# Patient Record
Sex: FEMALE | Race: WHITE | URBAN - METROPOLITAN AREA
[De-identification: names, ages, dates, MRNs, and addresses within clinical notes are randomized per-mention and may not be internally consistent; named-entity substitution may affect disease eponyms.]

---

## 2017-01-31 NOTE — PATIENT INSTRUCTIONS
Continue medications as directed    If unable to get ativan from your previous doctor, take 1/2 pill daily for 6 days, then 1/4 pill after that    Follow up for physical  Return to clinic for any new or worsening symptoms or go to ER Urgent care in off hours

## 2017-02-02 ENCOUNTER — TELEPHONE (OUTPATIENT)
Dept: PALLIATIVE MEDICINE | Facility: CLINIC | Age: 52
End: 2017-02-02

## 2017-02-02 ENCOUNTER — OFFICE VISIT (OUTPATIENT)
Dept: FAMILY MEDICINE | Facility: CLINIC | Age: 52
End: 2017-02-02
Payer: COMMERCIAL

## 2017-02-02 ENCOUNTER — OFFICE VISIT (OUTPATIENT)
Dept: PALLIATIVE MEDICINE | Facility: CLINIC | Age: 52
End: 2017-02-02
Payer: COMMERCIAL

## 2017-02-02 VITALS — DIASTOLIC BLOOD PRESSURE: 74 MMHG | OXYGEN SATURATION: 98 % | SYSTOLIC BLOOD PRESSURE: 136 MMHG | HEART RATE: 95 BPM

## 2017-02-02 VITALS
BODY MASS INDEX: 20.11 KG/M2 | DIASTOLIC BLOOD PRESSURE: 78 MMHG | OXYGEN SATURATION: 97 % | TEMPERATURE: 98.2 F | HEART RATE: 94 BPM | HEIGHT: 67 IN | SYSTOLIC BLOOD PRESSURE: 126 MMHG | WEIGHT: 128.1 LBS

## 2017-02-02 DIAGNOSIS — M79.7 FIBROMYALGIA: ICD-10-CM

## 2017-02-02 DIAGNOSIS — F41.1 GAD (GENERALIZED ANXIETY DISORDER): ICD-10-CM

## 2017-02-02 DIAGNOSIS — G43.719 INTRACTABLE CHRONIC MIGRAINE WITHOUT AURA AND WITHOUT STATUS MIGRAINOSUS: Primary | ICD-10-CM

## 2017-02-02 PROCEDURE — 64615 CHEMODENERV MUSC MIGRAINE: CPT | Performed by: PAIN MEDICINE

## 2017-02-02 PROCEDURE — 99214 OFFICE O/P EST MOD 30 MIN: CPT | Performed by: PHYSICIAN ASSISTANT

## 2017-02-02 RX ORDER — DULOXETIN HYDROCHLORIDE 20 MG/1
20 CAPSULE, DELAYED RELEASE ORAL 2 TIMES DAILY
Qty: 60 CAPSULE | Refills: 3 | Status: SHIPPED
Start: 2017-02-02 | End: 2017-02-02

## 2017-02-02 RX ORDER — DULOXETIN HYDROCHLORIDE 20 MG/1
20 CAPSULE, DELAYED RELEASE ORAL 2 TIMES DAILY
Qty: 60 CAPSULE | Refills: 3 | Status: SHIPPED
Start: 2017-02-02

## 2017-02-02 RX ORDER — CYCLOBENZAPRINE HCL 10 MG
10 TABLET ORAL 2 TIMES DAILY PRN
Qty: 60 TABLET | Refills: 3 | Status: SHIPPED | OUTPATIENT
Start: 2017-02-02

## 2017-02-02 RX ORDER — LORAZEPAM 1 MG/1
TABLET ORAL
Qty: 4 TABLET | Refills: 0 | Status: SHIPPED | OUTPATIENT
Start: 2017-02-02

## 2017-02-02 RX ORDER — LORAZEPAM 1 MG/1
TABLET ORAL
Qty: 4 TABLET | Refills: 0 | Status: SHIPPED | OUTPATIENT
Start: 2017-02-02 | End: 2017-02-02

## 2017-02-02 RX ORDER — GABAPENTIN 600 MG/1
600 TABLET ORAL 3 TIMES DAILY
Qty: 90 TABLET | Refills: 3 | Status: SHIPPED | OUTPATIENT
Start: 2017-02-02

## 2017-02-02 ASSESSMENT — PAIN SCALES - GENERAL: PAINLEVEL: MODERATE PAIN (5)

## 2017-02-02 NOTE — NURSING NOTE
"Chief Complaint   Patient presents with     Pain       Initial /74 mmHg  Pulse 95  SpO2 98% Estimated body mass index is 19.89 kg/(m^2) as calculated from the following:    Height as of 8/10/16: 1.702 m (5' 7\").    Weight as of 8/10/16: 57.607 kg (127 lb).  BP completed using cuff size: regular    Camilo Yee MA  [facility]      "

## 2017-02-02 NOTE — NURSING NOTE
"Chief Complaint   Patient presents with     Recheck Medication       Initial /78 mmHg  Pulse 94  Temp(Src) 98.2  F (36.8  C) (Oral)  Ht 5' 7\" (1.702 m)  Wt 128 lb 1.6 oz (58.106 kg)  BMI 20.06 kg/m2  SpO2 97% Estimated body mass index is 20.06 kg/(m^2) as calculated from the following:    Height as of this encounter: 5' 7\" (1.702 m).    Weight as of this encounter: 128 lb 1.6 oz (58.106 kg).  BP completed using cuff size: regular    "

## 2017-02-02 NOTE — PATIENT INSTRUCTIONS
[facility]  1. Will start Cymbalta 20 mg in the morning. If well tolerated, increase after > 7 days to 20 mg morning and 20 mg bedtime.  Monitor mood. We talked about the fact that this medication affects Serotonin; please notify your doctors if they prescribe new medications for you in the future. Other side effects may include weight gain, withdrawal syndrome.   2. Continue Gabapentin.     3. You underwent repeat Botox injection today. See post-procedure instructions, below.     4. Will plan to repeat Botox injection beginning of May.       Monitor the injection sites for signs and symptoms of infection-fever, chills, redness, swelling, warmth, or drainage to areas.    You may have soreness at injection sites for up to 24 hours.    It can take up to 1 month to notice benefit from the 1st treatment    If you do notice relief Botox can be done every 3 months.  It may require insurance authorization every time.      Any questions about insurance coverage, please call the main clinic number and ask to speak with someone about Botox coverage.     If you are able to use anti-inflammatory medications or Tylenol for pain control, you can take these as directed.  Nurse line:  [phone]  After hours doctor line:  [phone]  Appointment line:  [phone]

## 2017-02-02 NOTE — MR AVS SNAPSHOT
After Visit Summary   2/2/2017    Rena Thacker    MRN: [id number]           Patient Information     Date Of Birth          1965        Visit Information        Provider Department      2/2/2017 11:30 AM Jo Ann Bruner MD [facility]        Today's Diagnoses     Intractable chronic migraine without aura and without status migrainosus    -  1     Fibromyalgia           Care Instructions    [facility]  1. Will start Cymbalta 20 mg in the morning. If well tolerated, increase after > 7 days to 20 mg morning and 20 mg bedtime.  Monitor mood. We talked about the fact that this medication affects Serotonin; please notify your doctors if they prescribe new medications for you in the future. Other side effects may include weight gain, withdrawal syndrome.   2. Continue Gabapentin.     3. You underwent repeat Botox injection today. See post-procedure instructions, below.     4. Will plan to repeat Botox injection beginning of May.       Monitor the injection sites for signs and symptoms of infection-fever, chills, redness, swelling, warmth, or drainage to areas.    You may have soreness at injection sites for up to 24 hours.    It can take up to 1 month to notice benefit from the 1st treatment    If you do notice relief Botox can be done every 3 months.  It may require insurance authorization every time.      Any questions about insurance coverage, please call the main clinic number and ask to speak with someone about Botox coverage.     If you are able to use anti-inflammatory medications or Tylenol for pain control, you can take these as directed.  Nurse line:  [phone]  After hours doctor line:  [phone]  Appointment line:  [phone]          Follow-ups after your visit        Your next 10 appointments already scheduled     Feb 02, 2017  1:20 PM   PHYSICAL with Lauren Ingram PA-C   [facility] ([facility]]  "[place]    [address] [place] [id number] [phone]              Who to contact     If you have questions or need follow up information about today's clinic visit or your schedule please contact [facility] directly at [phone].  Normal or non-critical lab and imaging results will be communicated to you by MyChart, letter or phone within 4 business days after the clinic has received the results. If you do not hear from us within 7 days, please contact the clinic through MyChart or phone. If you have a critical or abnormal lab result, we will notify you by phone as soon as possible.  Submit refill requests through [employer] or call your pharmacy and they will forward the refill request to us. Please allow 3 business days for your refill to be completed.          Additional Information About Your Visit        MyChart Information     [employer] lets you send messages to your doctor, view your test results, renew your prescriptions, schedule appointments and more. To sign up, go to [email or web address] . Click on \"Log in\" on the left side of the screen, which will take you to the Welcome page. Then click on \"Sign up Now\" on the right side of the page.     You will be asked to enter the access code listed below, as well as some personal information. Please follow the directions to create your username and password.     Your access code is: [email or web address]  Expires: 5/3/2017 12:19 PM     Your access code will  in 90 days. If you need help or a new code, please call your [place] clinic or [phone].        Care EveryWhere ID     This is your Care EveryWhere ID. This could be used by other organizations to access your [place] medical records  [id number]        Your Vitals Were     Pulse Pulse Oximetry                95 98%           Blood Pressure from Last 3 Encounters:   17 136/74   08/10/16 121/72   16 123/77    Weight from Last 3 " Encounters:   08/10/16 57.607 kg (127 lb)   04/07/16 59.421 kg (131 lb)   03/09/16 58.06 kg (128 lb)              Today, you had the following     No orders found for display         Today's Medication Changes          These changes are accurate as of: 2/2/17 12:31 PM.  If you have any questions, ask your nurse or doctor.               Start taking these medicines.        Dose/Directions    DULoxetine 20 MG EC capsule   Commonly known as:  CYMBALTA   Used for:  Fibromyalgia   Started by:  Jo Ann Bruner MD        Dose:  20 mg   Take 1 capsule (20 mg) by mouth 2 times daily Start 20 mg in the morning. If well tolerated, increase after > 7 days to 20 mg morning and 20 mg bedtime.   Quantity:  60 capsule   Refills:  3         These medicines have changed or have updated prescriptions.        Dose/Directions    * botulinum toxin type A 100 UNITS injection   Commonly known as:  BOTOX   This may have changed:  Another medication with the same name was added. Make sure you understand how and when to take each.   Used for:  Spasm of muscle   Changed by:  Doug Boyd MD        For injection on 12-11-08.   Quantity:  1 vial   Refills:  0       * botulinum toxin type A 100 UNITS injection   Commonly known as:  BOTOX   This may have changed:  You were already taking a medication with the same name, and this prescription was added. Make sure you understand how and when to take each.   Used for:  Intractable chronic migraine without aura and without status migrainosus   Changed by:  Jo Ann Bruner MD        100 unit vial x 2. For MD injection in clinic only.   Quantity:  200 Units   Refills:  0       * Notice:  This list has 2 medication(s) that are the same as other medications prescribed for you. Read the directions carefully, and ask your doctor or other care provider to review them with you.         Where to get your medicines      These medications were sent to [facility]- - [place], [place] -  [address]  [address] [address]     Phone:  [phone]    - DULoxetine 20 MG EC capsule      Some of these will need a paper prescription and others can be bought over the counter.  Ask your nurse if you have questions.     Bring a paper prescription for each of these medications    - botulinum toxin type A 100 UNITS injection             Primary Care Provider    None Specified       No primary provider on file.        Thank you!     Thank you for choosing [facility]  for your care. Our goal is always to provide you with excellent care. Hearing back from our patients is one way we can continue to improve our services. Please take a few minutes to complete the written survey that you may receive in the mail after your visit with us. Thank you!             Your Updated Medication List - Protect others around you: Learn how to safely use, store and throw away your medicines at www.disposemymeds.org.          This list is accurate as of: 2/2/17 12:31 PM.  Always use your most recent med list.                   Brand Name Dispense Instructions for use    amitriptyline 25 MG tablet    ELAVIL    60    Take 2 tablets by mouth every night at bedtime       ATIVAN PO          * botulinum toxin type A 100 UNITS injection    BOTOX    1 vial    For injection on 12-11-08.       * botulinum toxin type A 100 UNITS injection    BOTOX    200 Units    100 unit vial x 2. For MD injection in clinic only.       DULoxetine 20 MG EC capsule    CYMBALTA    60 capsule    Take 1 capsule (20 mg) by mouth 2 times daily Start 20 mg in the morning. If well tolerated, increase after > 7 days to 20 mg morning and 20 mg bedtime.       FLEXERIL PO      Take 10 mg by mouth 3 times daily       gabapentin 300 MG capsule    NEURONTIN    250 capsule    Take  by mouth. take 2 caps po QID       * Ketoprofen Powd     120grams    10% in PLO gel apply to pain sites TID PRN       * Ketoprofen Powd     120 gram     10% in plo gel       * Ketoprofen Powd     100 g    Ketoprofen 10% gel in PLO. Please apply dime-sized amount to affected area up to 3 times per day as needed.       * ZANAFLEX 2 MG tablet   Generic drug:  tiZANidine     180    Take one to four  tablets by mouth 4 times per day prn       * TiZANidine HCl 2 MG Caps     120    2 tablets twice a day, prn       * Notice:  This list has 7 medication(s) that are the same as other medications prescribed for you. Read the directions carefully, and ask your doctor or other care provider to review them with you.

## 2017-02-02 NOTE — PROGRESS NOTES
"  SUBJECTIVE:                                                    Rena Thacker is a 51 year old female who presents to clinic today for the following health issues:      Medication Followup of all medication    Taking Medication as prescribed: yes    Side Effects:  None    Medication Helping Symptoms:  yes     Switching care completely from [place] to here  Just added cymbalta at pain clinic today    Needs refills of medications including ativan. She has never taken anything else for her anxiety except ativan three times daily    No other complaints or concerns        Problem list and histories reviewed & adjusted, as indicated.  Additional history: as documented    ROS:  C: NEGATIVE for fever, chills, change in weight  ENDOCRINE: NEGATIVE for temperature intolerance, skin/hair changes  PSYCHIATRIC: NEGATIVE forthoughts of hurting someone else and thoughts of self harm    Patient Active Problem List   Diagnosis     Myalgia and myositis     Postconcussion syndrome     Other migraine without status migrainosus, not intractable     BREONNA (generalized anxiety disorder)     History reviewed. No pertinent past surgical history.    Social History   Substance Use Topics     Smoking status: Current Every Day Smoker     Smokeless tobacco: Not on file     Alcohol Use: No     History reviewed. No pertinent family history.        Problem list, Medication list, Allergies, and Medical/Social/Surgical histories reviewed in EPIC and updated as appropriate.    OBJECTIVE:                                                    /78 mmHg  Pulse 94  Temp(Src) 98.2  F (36.8  C) (Oral)  Ht 5' 7\" (1.702 m)  Wt 128 lb 1.6 oz (58.106 kg)  BMI 20.06 kg/m2  SpO2 97% Body mass index is 20.06 kg/(m^2).   GENERAL:  Alert and oriented x 3.  WD WN    PSYCH: Mood: \"good\"   Affect: bright    Insight: poor   Thought process: linear         ASSESSMENT/PLAN:                                                        ICD-10-CM    1. Chronic migraine " "G43.709 gabapentin (NEURONTIN) 600 MG tablet     cyclobenzaprine (FLEXERIL) 10 MG tablet     amitriptyline (ELAVIL) 25 MG tablet   2. Fibromyalgia M79.7 gabapentin (NEURONTIN) 600 MG tablet     cyclobenzaprine (FLEXERIL) 10 MG tablet   3. BREONNA (generalized anxiety disorder) F41.1 MENTAL HEALTH REFERRAL     LORazepam (ATIVAN) 1 MG tablet     DISCONTINUED: LORazepam (ATIVAN) 1 MG tablet     Advised daily use of ativan is not appropriate for anxiety. I have given her 4 pills to help taper off this medication. She was not happy about her medication regimen being changed, but I advised cymbalta is also an anti-anxiety medication which will help decrease the use of ativan in general. Just in case the cymbalta is not effective, I have placed a referral to psychiatry for further management of this. She was advised in the future she'll need to be more proactive about getting medical records readily available should she decide to change providers. Also advised she is welcome to change providers if she isn't happy with the change in medications, but I don't feel comfortable prescribing ativan on a daily basis.       Total time 25 minutes, greater than 50% counseling which is discussed in plan above and in patient instructions.   Patient Instructions     Continue medications as directed    If unable to get ativan from your previous doctor, take 1/2 pill daily for 6 days, then 1/4 pill after that    Follow up for physical  Return to clinic for any new or worsening symptoms or go to ER Urgent care in off hours          Estimated body mass index is 20.06 kg/(m^2) as calculated from the following:    Height as of this encounter: 5' 7\" (1.702 m).    Weight as of this encounter: 128 lb 1.6 oz (58.106 kg).       Lauren Shepherd [facility]  [facility]        "

## 2017-02-02 NOTE — MR AVS SNAPSHOT
After Visit Summary   2/2/2017    Rena Thacker    MRN: [id number]           Patient Information     Date Of Birth          1965        Visit Information        Provider Department      2/2/2017 1:20 PM Lauren Ingram PA-C [facility]        Today's Diagnoses     Chronic migraine    -  1     Fibromyalgia         BREONNA (generalized anxiety disorder)           Care Instructions      Continue medications as directed    If unable to get ativan from your previous doctor, take 1/2 pill daily for 6 days, then 1/4 pill after that    Follow up for physical  Return to clinic for any new or worsening symptoms or go to ER Urgent care in off hours          Follow-ups after your visit        Additional Services     MENTAL HEALTH REFERRAL       Your provider has referred you to: [facility]: [facility] Psychiatry Services - [facility] Primary [facility] [phone]   [email or web address]   *Referral from [facility] Primary Care Provider required - Consultation Model - medication management & future refills will be returned to [facility] PCP upon completion of evaluation  *Please call to schedule an appointment.  [facility]: Psychiatry Clinic - [place] [phone]   [email or web address]    All scheduling is subject to the client's specific insurance plan & benefits, provider/location availability, and provider clinical specialities.  Please arrive 15 minutes early for your first appointment and bring your completed paperwork.    Please be aware that coverage of these services is subject to the terms and limitations of your health insurance plan.  Call member services at your health plan with any benefit or coverage questions.                  Your next 10 appointments already scheduled     May 03, 2017 11:30 AM   PROCEDURE with Jo Ann Bruner MD   [facility]  "([facility])    [address]  [address]  [facility] [address] [phone]              Who to contact     If you have questions or need follow up information about today's clinic visit or your schedule please contact [facility] directly at [phone].  Normal or non-critical lab and imaging results will be communicated to you by MyChart, letter or phone within 4 business days after the clinic has received the results. If you do not hear from us within 7 days, please contact the clinic through MyChart or phone. If you have a critical or abnormal lab result, we will notify you by phone as soon as possible.  Submit refill requests through [employer] or call your pharmacy and they will forward the refill request to us. Please allow 3 business days for your refill to be completed.          Additional Information About Your Visit        MyChart Information     [employer] lets you send messages to your doctor, view your test results, renew your prescriptions, schedule appointments and more. To sign up, go to [email or web address] . Click on \"Log in\" on the left side of the screen, which will take you to the Welcome page. Then click on \"Sign up Now\" on the right side of the page.     You will be asked to enter the access code listed below, as well as some personal information. Please follow the directions to create your username and password.     Your access code is: [email or web address]  Expires: 5/3/2017 12:19 PM     Your access code will  in 90 days. If you need help or a new code, please call your [facility] or [phone].        Care EveryWhere ID     This is your Care EveryWhere ID. This could be used by other organizations to access your [place] medical records  [id number]        Your Vitals Were     Pulse Temperature Height BMI (Body Mass Index) Pulse Oximetry       94 98.2  F (36.8  C) (Oral) 5' 7\" (1.702 m) 20.06 kg/m2 97%        Blood Pressure from Last 3 Encounters: "   02/02/17 126/78   02/02/17 136/74   08/10/16 121/72    Weight from Last 3 Encounters:   02/02/17 128 lb 1.6 oz (58.106 kg)   08/10/16 127 lb (57.607 kg)   04/07/16 131 lb (59.421 kg)              We Performed the Following     MENTAL HEALTH REFERRAL          Today's Medication Changes          These changes are accurate as of: 2/2/17  1:47 PM.  If you have any questions, ask your nurse or doctor.               Start taking these medicines.        Dose/Directions    DULoxetine 20 MG EC capsule   Commonly known as:  CYMBALTA   Used for:  Fibromyalgia   Started by:  Jo Ann Bruner MD        Dose:  20 mg   Take 1 capsule (20 mg) by mouth 2 times daily Start 20 mg in the morning. If well tolerated, increase after > 7 days to 20 mg morning and 20 mg bedtime.   Quantity:  60 capsule   Refills:  3         These medicines have changed or have updated prescriptions.        Dose/Directions    * amitriptyline 25 MG tablet   Commonly known as:  ELAVIL   This may have changed:  Another medication with the same name was added. Make sure you understand how and when to take each.   Used for:  Headache, migraine   Changed by:  Doug Boyd MD        Take 2 tablets by mouth every night at bedtime   Quantity:  60   Refills:  0       * amitriptyline 25 MG tablet   Commonly known as:  ELAVIL   This may have changed:  You were already taking a medication with the same name, and this prescription was added. Make sure you understand how and when to take each.   Used for:  Chronic migraine   Changed by:  Lauren Ingram PA-C        Dose:  50 mg   Take 2 tablets (50 mg) by mouth At Bedtime   Quantity:  60 tablet   Refills:  3       * botulinum toxin type A 100 UNITS injection   Commonly known as:  BOTOX   This may have changed:  Another medication with the same name was added. Make sure you understand how and when to take each.   Used for:  Spasm of muscle   Changed by:  Doug Boyd MD        For injection on  12-11-08.   Quantity:  1 vial   Refills:  0       * botulinum toxin type A 100 UNITS injection   Commonly known as:  BOTOX   This may have changed:  You were already taking a medication with the same name, and this prescription was added. Make sure you understand how and when to take each.   Used for:  Intractable chronic migraine without aura and without status migrainosus   Changed by:  Jo Ann Bruner MD        100 unit vial x 2. For MD injection in clinic only.   Quantity:  200 Units   Refills:  0       * FLEXERIL PO   This may have changed:  Another medication with the same name was added. Make sure you understand how and when to take each.   Used for:  Chronic migraine, Fibromyalgia   Changed by:  Lauren Ingram PA-C        Dose:  10 mg   Take 10 mg by mouth 2 times daily   Refills:  0       * cyclobenzaprine 10 MG tablet   Commonly known as:  FLEXERIL   This may have changed:  You were already taking a medication with the same name, and this prescription was added. Make sure you understand how and when to take each.   Used for:  Chronic migraine, Fibromyalgia   Changed by:  Lauren Ingram PA-C        Dose:  10 mg   Take 1 tablet (10 mg) by mouth 2 times daily as needed for muscle spasms   Quantity:  60 tablet   Refills:  3       * gabapentin 300 MG capsule   Commonly known as:  NEURONTIN   This may have changed:    - when to take this  - additional instructions   Used for:  Migraine, unspecified, with intractable migraine, so stated, without mention of status migrainosus, Myalgia and myositis, unspecified, Postconcussion syndrome   Changed by:  Doug Boyd MD        Take  by mouth. take 2 caps po QID   Quantity:  250 capsule   Refills:  3       * gabapentin 600 MG tablet   Commonly known as:  NEURONTIN   This may have changed:  You were already taking a medication with the same name, and this prescription was added. Make sure you understand how and when to take each.   Used  for:  Chronic migraine, Fibromyalgia   Changed by:  Lauren Ingram PA-C        Dose:  600 mg   Take 1 tablet (600 mg) by mouth 3 times daily   Quantity:  90 tablet   Refills:  3       LORazepam 1 MG tablet   Commonly known as:  ATIVAN   This may have changed:    - medication strength  - how much to take  - how to take this  - when to take this  - additional instructions   Used for:  BREONNA (generalized anxiety disorder)   Changed by:  Lauren Ingram PA-C        1/2 tab once daily. No refills until we get previous records   Quantity:  4 tablet   Refills:  0       * Notice:  This list has 8 medication(s) that are the same as other medications prescribed for you. Read the directions carefully, and ask your doctor or other care provider to review them with you.      Stop taking these medicines if you haven't already. Please contact your care team if you have questions.     Ketoprofen Powd   Stopped by:  Lauren Ingram PA-C           TiZANidine HCl 2 MG Caps   Stopped by:  Lauren Ingram PA-C           ZANAFLEX 2 MG tablet   Generic drug:  tiZANidine   Stopped by:  Lauren Ingram PA-C                Where to get your medicines      These medications were sent to [facility] [address]     Phone:  [phone]    - amitriptyline 25 MG tablet  - cyclobenzaprine 10 MG tablet  - DULoxetine 20 MG EC capsule  - gabapentin 600 MG tablet      Some of these will need a paper prescription and others can be bought over the counter.  Ask your nurse if you have questions.     Bring a paper prescription for each of these medications    - botulinum toxin type A 100 UNITS injection  - LORazepam 1 MG tablet             Primary Care Provider    None Specified       No primary provider on file.        Thank you!     Thank you for choosing [facility]  for your care. Our  goal is always to provide you with excellent care. Hearing back from our patients is one way we can continue to improve our services. Please take a few minutes to complete the written survey that you may receive in the mail after your visit with us. Thank you!             Your Updated Medication List - Protect others around you: Learn how to safely use, store and throw away your medicines at www.disposemymeds.org.          This list is accurate as of: 2/2/17  1:47 PM.  Always use your most recent med list.                   Brand Name Dispense Instructions for use    * amitriptyline 25 MG tablet    ELAVIL    60    Take 2 tablets by mouth every night at bedtime       * amitriptyline 25 MG tablet    ELAVIL    60 tablet    Take 2 tablets (50 mg) by mouth At Bedtime       * botulinum toxin type A 100 UNITS injection    BOTOX    1 vial    For injection on 12-11-08.       * botulinum toxin type A 100 UNITS injection    BOTOX    200 Units    100 unit vial x 2. For MD injection in clinic only.       DULoxetine 20 MG EC capsule    CYMBALTA    60 capsule    Take 1 capsule (20 mg) by mouth 2 times daily Start 20 mg in the morning. If well tolerated, increase after > 7 days to 20 mg morning and 20 mg bedtime.       * FLEXERIL PO      Take 10 mg by mouth 2 times daily       * cyclobenzaprine 10 MG tablet    FLEXERIL    60 tablet    Take 1 tablet (10 mg) by mouth 2 times daily as needed for muscle spasms       * gabapentin 300 MG capsule    NEURONTIN    250 capsule    Take  by mouth. take 2 caps po QID       * gabapentin 600 MG tablet    NEURONTIN    90 tablet    Take 1 tablet (600 mg) by mouth 3 times daily       LORazepam 1 MG tablet    ATIVAN    4 tablet    1/2 tab once daily. No refills until we get previous records       * Notice:  This list has 8 medication(s) that are the same as other medications prescribed for you. Read the directions carefully, and ask your doctor or other care provider to review them with you.

## 2017-02-02 NOTE — TELEPHONE ENCOUNTER
Patient scheduled 5/3 for botox, needs a new PA submitted to [employer].        Rere ORTEZ    [facility]

## 2017-02-03 ENCOUNTER — TELEPHONE (OUTPATIENT)
Dept: FAMILY MEDICINE | Facility: CLINIC | Age: 52
End: 2017-02-03

## 2017-02-03 NOTE — Clinical Note
[place] [id number] [place] [address]  [facility] [address]  [phone]      February 3, 2017      Rena Thacker  [address] [facility] [address]          Dear Rena    In order to ensure we are providing the best quality care, we have reviewed your chart and see that you are due for:    1. Pap smear    Please call the clinic at your earliest convenience to schedule an appointment.  If you have completed these please contact our office via phone at [phone] or [facility] to update our records.  We would like to know the date (approximately month and year), the result, and ideally where the procedure was performed.    Thank you for trusting us with your health care.      Sincerely,    Care Team for Lauren Ingram PA-C

## 2017-02-03 NOTE — TELEPHONE ENCOUNTER
Faxed completed PA form and supporting documentation for Botox to [employer]. Right fax confirmation 2/3 at 8:29 am.      Rere ORTEZ    [facility]

## 2017-02-03 NOTE — TELEPHONE ENCOUNTER
Panel Management Review      Patient has the following on her problem list: None      Composite cancer screening  Chart review shows that this patient is due/due soon for the following Pap Smear  Summary:    Patient is due/failing the following:   PAP    Action needed:   Patient needs office visit for Pap Smear.    Type of outreach:    Sent letter.    Questions for provider review:    None                                                                                                                                    Allison Verdin MA       Chart routed to none.

## 2017-02-14 NOTE — TELEPHONE ENCOUNTER
PA approved.  Effective date: 2/3/17-2/13/18  PA reference #: [id number]  Pt. notified:   Yes   Pt. informed directly.      Rere ORTEZ    [facility]

## 2017-02-20 NOTE — PROGRESS NOTES
"[facility] - Procedure Note    Date of Visit: 02/02/17    Interval History: Rena Thacker is a 51-year-old female with history of migraine headache, who returns to clinic for repeat Botox injection. She was last seen in clinic for Botox injection on 08/10/16. She states that this worked well. She reports only one major headache episode since the fall. She reports gradual return of headache symptoms since November. Overall, her pain is 4-5/10 in intensity today. She continues to tolerate Gabapentin well. She reports limited use of Imitrex overall. She continues to report benefit from use of Flexeril. She notes history of mood impairment, which she feels is \"situational\". She has been prescribed Lorazepam for anxiety in the past. She is followed by Mare Ingram PA-C for primary care.    Electronic Chart Review: Patient history, pertinent diagnostic studies, vitals, allergies, and medications were reviewed.    Review of Systems: The patient denies recent fever, chills, illness, use of antibiotics or anticoagulants. Allergy to codeine. No allergy to Botox.     Physical Examination:  /74  Pulse 95  SpO2 98%  GEN: Alert. Well developed, well nourished.  CV/Resp: Symmetric chest wall excursion. Non-labored breathing. No audible wheezing.  Skin: No rashes or lesions of head/neck.  Extremities: Distal extremities warm, well perfused.  Neuro/MSK: No new functional deficits appreciated.     Procedure Description:    Pre-procedure Diagnosis: Chronic intractable migraine headache  Post-procedure Diagnosis: Chronic intractable migraine headache  Procedure performed: Botox injection   : Jo Ann Bruner MD    Medication solution prepared: 200 units of onabotulinumtoxinA in 6 mL of sterile normal saline (lot #[id number], expiration 08/2019; lot #[id number], expiration 09/2019)  Medication solution administered: 155 units of onabotulinumtoxinA used, 45 units discarded  Muscle groups treated: (31 " "sites)   Bilateral : 10 units divided in 2 sites   Procerus (midline): 5 units in 1 site   Bilateral frontalis: 20 units divided in 4 sites   Bilateral temporalis: 40 units divided in 8 sites   Bilateral occipitalis: 30 units divided in 6 sites   Bilateral cervical paraspinals: 20 units divided in 4 sites   Bilateral trapezius: 30 units divided in 6 sites  Sterile prep/cleansing solution: ChloraPrep, alcohol    The procedure, risks, and benefits were explained, and informed written consent was obtained from the patient. Risks include but are not limited to: infection, bleeding, neck pain, headache, eyelid ptosis, dysphagia, pneumothorax, bronchitis, muscular pain/myalgia, weakness, stiffness, paresis, and other damage to soft tissue, nerve, muscle, and vasculature structures. Immediately prior to the start of the procedure, a \"time out\" safety check was conducted to confirm correct patient, procedure, and laterality. The patient was positioned in a seated position. The appropriate target sites were identified by inspection and palpation of anatomical landmarks. The skin over the area was prepped and draped appropriately. Following negative aspiration for heme and air, a 30-gauge, 1 inch needle was used to inject onabotulinumtoxinA into each identified site in the forehead and scalp, as noted above. Following negative aspiration for heme and air, a 25-gauge, 1.5 inch needle was used to inject onabotulinumtoxinA into the remainder of sites in the posterior neck and upper shoulders, as noted above.    The patient tolerated the procedure well, and there was no evidence of procedural complications. The patient was stable following the procedure. The lungs were clear to auscultation, and the patient was without respiratory symptoms. Post-procedure instructions were provided upon discharge.    Assessment/Plan: Rena Thacker is a 51-year-old female s/p Botox injection today for chronic intractable migraine " headache.      Following today's procedure, the patient was advised to contact the [facility] for any of the following:  Fever, chills, or night sweats  New onset of pain, numbness, or weakness  New respiratory symptoms such as dyspnea, shortness of breath, or cough  Any questions/concerns regarding the procedure  If unable to contact the Pain Center, the patient was instructed to go to a local Emergency Room for any complications.    Continue Gabapentin 600 mg TID.     Will start Cymbalta for pain and adjunctive mood control. She was provided with titration instructions up to 20 mg BID. Risks/side effects were discussed.    It may be reasonable to continue as needed use of Flexeril for muscle pain/spasm. Monthly allotment should be discussed with her primary care provider, who is ultimately responsible for any long-term prescribing.     If perceived to be beneficial, Botox injection may be repeated > 3 months. Will plan for repeat injection around May.      Jo Ann Bruner MD  [facility]

## 2017-04-24 ENCOUNTER — TELEPHONE (OUTPATIENT)
Dept: FAMILY MEDICINE | Facility: CLINIC | Age: 52
End: 2017-04-24

## 2017-04-24 NOTE — TELEPHONE ENCOUNTER
Panel Management Review      Patient has the following on her problem list: None      Composite cancer screening  Chart review shows that this patient is due/due soon for the following Pap Smear  Summary:    Patient is due/failing the following:   PAP, LDL    Action needed:   Patient needs office visit for pap smear.    Type of outreach:    Sent letter.    Questions for provider review:    None                                                                                                                                    Allison Verdin MA       Chart routed to none.

## 2017-04-24 NOTE — LETTER
[place] [id number] [place] [address]  [facility] [address]  [phone]      April 24, 2017      Rena Thacker  [address] [facility] [address]          Dear Rena    In order to ensure we are providing the best quality care, we have reviewed your chart and see that you are due for:    1. Pap smear  2. Fasting blood work      Please call the clinic at your earliest convenience to schedule an appointment.  If you have completed these please contact our office via phone at [phone] or [employer] to update our records.  We would like to know the date (approximately month and year), the result, and ideally where the procedure was performed.    Thank you for trusting us with your health care.      Sincerely,    Care Team for Lauren Ingram PA-C

## 2017-05-01 ENCOUNTER — TELEPHONE (OUTPATIENT)
Dept: FAMILY MEDICINE | Facility: CLINIC | Age: 52
End: 2017-05-01

## 2017-05-01 NOTE — TELEPHONE ENCOUNTER
Mail returned to office on 4/27/17. Attempted to call patient with number on file on 5/1/17, but mailbox not set up so no VM was left.